# Patient Record
(demographics unavailable — no encounter records)

---

## 2025-03-18 NOTE — HISTORY OF PRESENT ILLNESS
[Patient reported PAP Smear was normal] : Patient reported PAP Smear was normal [Y] : Patient is sexually active [N] : Patient denies prior pregnancies [No] : Patient does not have concerns regarding sex [Currently Active] : currently active [FreeTextEntry1] : 26 y/o presented for annual exam  lmp 3/10/25 vaginal itching and discharge for the past 4 days after period finished  regular periods not in a relationship   [PapSmeardate] : 3 yrs ago [LMPDate] : 3/10/25 [MensesFreq] : 30-32 [MensesLength] : 4-5 [PGHxTotal] : 0 [TextBox_9] : 12 [TextBox_13] : 30-50 [TextBox_15] : 4-5

## 2025-03-18 NOTE — PHYSICAL EXAM
[Soft] : soft [Non-tender] : non-tender [Non-distended] : non-distended [No HSM] : No HSM [No Lesions] : no lesions [No Mass] : no mass [Examination Of The Breasts] : a normal appearance [Breast Palpation Diffuse Fibrous Tissue Bilateral] : fibrocystic changes [No Masses] : no breast masses were palpable [Labia Majora] : normal [Labia Majora Erythema] : erythema [Labia Minora] : normal [Normal] : normal [Anteversion] : anteverted [Uterine Adnexae] : non-palpable [Tenderness] : nontender [Enlarged ___ wks] : not enlarged [Mass ___ cm] : no uterine mass was palpated [FreeTextEntry1] : rash labia /introitus

## 2025-03-18 NOTE — HISTORY OF PRESENT ILLNESS
[Patient reported PAP Smear was normal] : Patient reported PAP Smear was normal [Y] : Patient is sexually active [N] : Patient denies prior pregnancies [No] : Patient does not have concerns regarding sex [Currently Active] : currently active [FreeTextEntry1] : 28 y/o presented for annual exam  lmp 3/10/25 vaginal itching and discharge for the past 4 days after period finished  regular periods not in a relationship   [PapSmeardate] : 3 yrs ago [LMPDate] : 3/10/25 [MensesFreq] : 30-32 [MensesLength] : 4-5 [PGHxTotal] : 0 [TextBox_9] : 12 [TextBox_13] : 30-22 [TextBox_15] : 4-5